# Patient Record
Sex: MALE | URBAN - METROPOLITAN AREA
[De-identification: names, ages, dates, MRNs, and addresses within clinical notes are randomized per-mention and may not be internally consistent; named-entity substitution may affect disease eponyms.]

---
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
## 2021-04-17 ENCOUNTER — HOSPITAL ENCOUNTER (EMERGENCY)
Facility: HOSPITAL | Age: 21
Discharge: HOME | End: 2021-04-18
Attending: STUDENT IN AN ORGANIZED HEALTH CARE EDUCATION/TRAINING PROGRAM
Payer: COMMERCIAL

## 2021-04-17 ENCOUNTER — APPOINTMENT (EMERGENCY)
Dept: RADIOLOGY | Facility: HOSPITAL | Age: 21
End: 2021-04-17
Attending: STUDENT IN AN ORGANIZED HEALTH CARE EDUCATION/TRAINING PROGRAM
Payer: COMMERCIAL

## 2021-04-17 DIAGNOSIS — J36 PERITONSILLAR ABSCESS: Primary | ICD-10-CM

## 2021-04-17 LAB
ANION GAP SERPL CALC-SCNC: 11 MEQ/L (ref 3–15)
BASOPHILS # BLD: 0.05 K/UL (ref 0.01–0.1)
BASOPHILS NFR BLD: 0.2 %
BUN SERPL-MCNC: 8 MG/DL (ref 8–20)
CALCIUM SERPL-MCNC: 9.8 MG/DL (ref 8.9–10.3)
CHLORIDE SERPL-SCNC: 101 MEQ/L (ref 98–109)
CO2 SERPL-SCNC: 24 MEQ/L (ref 22–32)
CREAT SERPL-MCNC: 1 MG/DL (ref 0.8–1.3)
DIFFERENTIAL METHOD BLD: ABNORMAL
EOSINOPHIL # BLD: 0.01 K/UL (ref 0.04–0.54)
EOSINOPHIL NFR BLD: 0 %
ERYTHROCYTE [DISTWIDTH] IN BLOOD BY AUTOMATED COUNT: 12.2 % (ref 11.6–14.4)
GFR SERPL CREATININE-BSD FRML MDRD: >60 ML/MIN/1.73M*2
GLUCOSE SERPL-MCNC: 94 MG/DL (ref 70–99)
HCT VFR BLDCO AUTO: 48.2 % (ref 40.1–51)
HGB BLD-MCNC: 15.9 G/DL (ref 13.7–17.5)
IMM GRANULOCYTES # BLD AUTO: 0.1 K/UL (ref 0–0.08)
IMM GRANULOCYTES NFR BLD AUTO: 0.5 %
LYMPHOCYTES # BLD: 2.24 K/UL (ref 1.2–3.5)
LYMPHOCYTES NFR BLD: 10.6 %
MCH RBC QN AUTO: 29.2 PG (ref 28–33.2)
MCHC RBC AUTO-ENTMCNC: 33 G/DL (ref 32.2–36.5)
MCV RBC AUTO: 88.6 FL (ref 83–98)
MONOCYTES # BLD: 1.61 K/UL (ref 0.3–1)
MONOCYTES NFR BLD: 7.6 %
NEUTROPHILS # BLD: 17.11 K/UL (ref 1.7–7)
NEUTS SEG NFR BLD: 81.1 %
NRBC BLD-RTO: 0 %
PDW BLD AUTO: 10.1 FL (ref 9.4–12.4)
PLATELET # BLD AUTO: 242 K/UL (ref 150–350)
POTASSIUM SERPL-SCNC: 3.9 MEQ/L (ref 3.6–5.1)
RBC # BLD AUTO: 5.44 M/UL (ref 4.5–5.8)
S PYO DNA THROAT QL NAA+PROBE: NOT DETECTED
SODIUM SERPL-SCNC: 136 MEQ/L (ref 136–144)
WBC # BLD AUTO: 21.12 K/UL (ref 3.8–10.5)

## 2021-04-17 PROCEDURE — G1004 CDSM NDSC: HCPCS

## 2021-04-17 PROCEDURE — 36415 COLL VENOUS BLD VENIPUNCTURE: CPT | Performed by: STUDENT IN AN ORGANIZED HEALTH CARE EDUCATION/TRAINING PROGRAM

## 2021-04-17 PROCEDURE — 63600000 HC DRUGS/DETAIL CODE: Mod: JW | Performed by: STUDENT IN AN ORGANIZED HEALTH CARE EDUCATION/TRAINING PROGRAM

## 2021-04-17 PROCEDURE — 86308 HETEROPHILE ANTIBODY SCREEN: CPT | Performed by: STUDENT IN AN ORGANIZED HEALTH CARE EDUCATION/TRAINING PROGRAM

## 2021-04-17 PROCEDURE — 25800000 HC PHARMACY IV SOLUTIONS: Performed by: STUDENT IN AN ORGANIZED HEALTH CARE EDUCATION/TRAINING PROGRAM

## 2021-04-17 PROCEDURE — 3E0333Z INTRODUCTION OF ANTI-INFLAMMATORY INTO PERIPHERAL VEIN, PERCUTANEOUS APPROACH: ICD-10-PCS | Performed by: STUDENT IN AN ORGANIZED HEALTH CARE EDUCATION/TRAINING PROGRAM

## 2021-04-17 PROCEDURE — 63600105 HC IODINE BASED CONTRAST: Performed by: STUDENT IN AN ORGANIZED HEALTH CARE EDUCATION/TRAINING PROGRAM

## 2021-04-17 PROCEDURE — 86665 EPSTEIN-BARR CAPSID VCA: CPT | Performed by: STUDENT IN AN ORGANIZED HEALTH CARE EDUCATION/TRAINING PROGRAM

## 2021-04-17 PROCEDURE — 87651 STREP A DNA AMP PROBE: CPT | Performed by: STUDENT IN AN ORGANIZED HEALTH CARE EDUCATION/TRAINING PROGRAM

## 2021-04-17 PROCEDURE — 96365 THER/PROPH/DIAG IV INF INIT: CPT | Mod: 59

## 2021-04-17 PROCEDURE — 96375 TX/PRO/DX INJ NEW DRUG ADDON: CPT | Mod: 59

## 2021-04-17 PROCEDURE — 85025 COMPLETE CBC W/AUTO DIFF WBC: CPT | Performed by: STUDENT IN AN ORGANIZED HEALTH CARE EDUCATION/TRAINING PROGRAM

## 2021-04-17 PROCEDURE — 25000000 HC PHARMACY GENERAL: Performed by: PHYSICIAN ASSISTANT

## 2021-04-17 PROCEDURE — 80048 BASIC METABOLIC PNL TOTAL CA: CPT | Performed by: STUDENT IN AN ORGANIZED HEALTH CARE EDUCATION/TRAINING PROGRAM

## 2021-04-17 PROCEDURE — 3E03329 INTRODUCTION OF OTHER ANTI-INFECTIVE INTO PERIPHERAL VEIN, PERCUTANEOUS APPROACH: ICD-10-PCS | Performed by: STUDENT IN AN ORGANIZED HEALTH CARE EDUCATION/TRAINING PROGRAM

## 2021-04-17 PROCEDURE — 96361 HYDRATE IV INFUSION ADD-ON: CPT

## 2021-04-17 PROCEDURE — 3E0337Z INTRODUCTION OF ELECTROLYTIC AND WATER BALANCE SUBSTANCE INTO PERIPHERAL VEIN, PERCUTANEOUS APPROACH: ICD-10-PCS | Performed by: STUDENT IN AN ORGANIZED HEALTH CARE EDUCATION/TRAINING PROGRAM

## 2021-04-17 PROCEDURE — 99284 EMERGENCY DEPT VISIT MOD MDM: CPT | Mod: 25

## 2021-04-17 RX ORDER — CLINDAMYCIN PHOSPHATE 600 MG/50ML
600 INJECTION, SOLUTION INTRAVENOUS ONCE
Status: COMPLETED | OUTPATIENT
Start: 2021-04-17 | End: 2021-04-17

## 2021-04-17 RX ORDER — PREDNISONE 5 MG/1
TABLET ORAL
Qty: 55 TABLET | Refills: 0 | Status: SHIPPED | OUTPATIENT
Start: 2021-04-18 | End: 2024-01-27

## 2021-04-17 RX ORDER — IBUPROFEN 400 MG/1
400 TABLET ORAL EVERY 6 HOURS PRN
COMMUNITY

## 2021-04-17 RX ORDER — DEXAMETHASONE SODIUM PHOSPHATE 4 MG/ML
10 INJECTION, SOLUTION INTRA-ARTICULAR; INTRALESIONAL; INTRAMUSCULAR; INTRAVENOUS; SOFT TISSUE ONCE
Status: COMPLETED | OUTPATIENT
Start: 2021-04-17 | End: 2021-04-17

## 2021-04-17 RX ORDER — CLINDAMYCIN HYDROCHLORIDE 150 MG/1
150 CAPSULE ORAL 3 TIMES DAILY
Qty: 30 CAPSULE | Refills: 0 | Status: SHIPPED | OUTPATIENT
Start: 2021-04-18 | End: 2021-04-28

## 2021-04-17 RX ADMIN — DEXAMETHASONE SODIUM PHOSPHATE 10 MG: 4 INJECTION, SOLUTION INTRA-ARTICULAR; INTRALESIONAL; INTRAMUSCULAR; INTRAVENOUS; SOFT TISSUE at 20:09

## 2021-04-17 RX ADMIN — IOHEXOL 75 ML: 300 INJECTION, SOLUTION INTRAVENOUS at 21:40

## 2021-04-17 RX ADMIN — SODIUM CHLORIDE 1000 ML: 9 INJECTION, SOLUTION INTRAVENOUS at 20:03

## 2021-04-17 RX ADMIN — CLINDAMYCIN IN 5 PERCENT DEXTROSE 600 MG: 12 INJECTION, SOLUTION INTRAVENOUS at 23:19

## 2021-04-17 ASSESSMENT — ENCOUNTER SYMPTOMS
FEVER: 0
NAUSEA: 0
TROUBLE SWALLOWING: 0
COUGH: 0
DIARRHEA: 0
ABDOMINAL PAIN: 0
CHILLS: 0
VOMITING: 0
SORE THROAT: 1
SHORTNESS OF BREATH: 0
CHEST TIGHTNESS: 0

## 2021-04-17 NOTE — ED ATTESTATION NOTE
"I saw and evaluated the patient, participated in the management, and agree with the findings in the above note. We discussed the case and the treatment plan.  Exam:  Visit Vitals  BP (!) 145/94   Pulse 72   Temp 37.2 °C (99 °F) (Tympanic)   Resp 18   Ht 1.727 m (5' 8\")   Wt 63.5 kg (140 lb)   SpO2 97%   BMI 21.29 kg/m²   vss, nad, nontoxic, head at/nc, normal speech, no resp distress, symmetric facial features, no trismus or drooling, pharynx with generalized erythema without exudate, pharynx appears symmetric; no obvious PTA. Mild-moderate dry mucous membranes. Neck supple with lymphadenopathy and tenderness R>L anterior-lateral neck. Coordinated movement. Stable gait.      Humberto Solorzano DO  04/17/21 2004    "

## 2021-04-18 VITALS
SYSTOLIC BLOOD PRESSURE: 131 MMHG | OXYGEN SATURATION: 99 % | WEIGHT: 140 LBS | HEIGHT: 68 IN | RESPIRATION RATE: 18 BRPM | TEMPERATURE: 99 F | HEART RATE: 64 BPM | BODY MASS INDEX: 21.22 KG/M2 | DIASTOLIC BLOOD PRESSURE: 78 MMHG

## 2021-04-18 NOTE — DISCHARGE INSTRUCTIONS
Take Clindamycin as instructed, your first dose was given in the ER. Take prednisone as directed. Follow up with ENT on Monday.    Return to the ED for worsening of symptoms or any problems or concerns.  It is very important to follow up with your healthcare provider for re-evaluation.

## 2021-04-19 LAB — HETEROPH AB SER QL LA: NEGATIVE

## 2021-04-20 LAB
EBV NA 1 IGG SER-ACNC: 5.94
EBV VCA IGG SER IA-ACNC: 4.97
EBV VCA IGM SER IA-ACNC: 0.85
INTERPRETATION: ABNORMAL

## 2024-01-27 ENCOUNTER — APPOINTMENT (EMERGENCY)
Dept: RADIOLOGY | Facility: HOSPITAL | Age: 24
End: 2024-01-27
Attending: EMERGENCY MEDICINE
Payer: COMMERCIAL

## 2024-01-27 ENCOUNTER — HOSPITAL ENCOUNTER (EMERGENCY)
Facility: HOSPITAL | Age: 24
Discharge: HOME | End: 2024-01-27
Attending: EMERGENCY MEDICINE
Payer: COMMERCIAL

## 2024-01-27 VITALS
HEART RATE: 67 BPM | SYSTOLIC BLOOD PRESSURE: 133 MMHG | OXYGEN SATURATION: 98 % | TEMPERATURE: 98.8 F | BODY MASS INDEX: 22.73 KG/M2 | RESPIRATION RATE: 18 BRPM | HEIGHT: 68 IN | DIASTOLIC BLOOD PRESSURE: 77 MMHG | WEIGHT: 150 LBS

## 2024-01-27 DIAGNOSIS — M25.312 INSTABILITY OF LEFT SHOULDER JOINT: Primary | ICD-10-CM

## 2024-01-27 PROCEDURE — 73030 X-RAY EXAM OF SHOULDER: CPT | Mod: LT

## 2024-01-27 PROCEDURE — 73000 X-RAY EXAM OF COLLAR BONE: CPT | Mod: LT

## 2024-01-27 PROCEDURE — 63700000 HC SELF-ADMINISTRABLE DRUG

## 2024-01-27 PROCEDURE — 99283 EMERGENCY DEPT VISIT LOW MDM: CPT | Mod: 25

## 2024-01-27 RX ORDER — ACETAMINOPHEN 325 MG/1
975 TABLET ORAL ONCE
Status: COMPLETED | OUTPATIENT
Start: 2024-01-27 | End: 2024-01-27

## 2024-01-27 RX ADMIN — ACETAMINOPHEN 975 MG: 325 TABLET ORAL at 19:48

## 2024-01-27 ASSESSMENT — ENCOUNTER SYMPTOMS
NAUSEA: 1
WEAKNESS: 1
FEVER: 0
COUGH: 0
CHILLS: 0

## 2024-01-27 NOTE — ED ATTESTATION NOTE
I have personally seen and examined Jaison Russell. I personally performed the key components of the encounter and provided a substantive portion of the care and medical decision making for this patient.    I reviewed and agree with physician assistant’s assessment and plan of care, with any exceptions as documented below.     My focused history, examination, assessment, and plan of care of Jaison Russell is as follows:    Brief History:   23-year-old right-handed male presented with intermittent left shoulder dislocations for the past several months.  Patient stated that he his frequency of dislocation reduction were approximately once per month now its almost every other day.  Last seen Tamara 2-1/2 months ago and has not schedule the outpatient MRI of his shoulder with order given by [facility] orthopedics.  Patient is here today because he felt tingling since left hand following the latest dislocation reduction episode  Focused Physical Exam:   Vitals:    01/27/24 1821   BP: (!) 165/104   Pulse: (!) 106   Resp: 18   Temp: 37.1 °C (98.8 °F)   SpO2: 97%   Awake and alert good eye contact pleasant cooperative.  Normal left clavicle without any AC tenting.  No obvious deformity of his left shoulder with intact full sensation to light touch without any numbness to the deltoid dermatome.  Full radial ulnar and median distribution sensation to light touch and movement of the left hand along with full radial ulnar pulses and good cap refills.  Nontender left hand left wrist and also left elbow.  Full strength in left elbow flexion against active resistance.  Fully able to take off his T-shirt and put on his gown without any limitations      Assessment / Plan / MDM:  Clinical history and exam concerning for but not limited to:  X-rays  Shoulder immobilizer       Baldev Blackwood MD  01/27/24 [id number]

## 2024-01-28 NOTE — ED PROVIDER NOTES
Emergency Medicine Note  HPI   HISTORY OF PRESENT ILLNESS     HPI   This is a 21-year-old male patient, past medical history of recurrent posterior shoulder dislocation followed by [facility] orthopedics, presenting status post left-sided posterior shoulder dislocation today.  Patient relates he was reaching backwards and felt his shoulder dislocate this morning, patient relates he is able to self reduce popped it back into place.  He relates some left shoulder pain, left clavicle pain, intermittent tingling, and decreased strength to the right upper extremity.  Patient is right-handed.  Denies any fever, cough, chills.  Patient has not taken anything for pain.  Pain is 10 out of 10 in severity.  Patient relates some nausea secondary to pain.      Patient History   PAST HISTORY     Reviewed from Nursing Triage:       History reviewed. No pertinent past medical history.    Past Surgical History:   Procedure Laterality Date   • CLEFT PALATE REPAIR         History reviewed. No pertinent family history.    Social History     Tobacco Use   • Smoking status: Some Days     Types: Cigarettes   • Smokeless tobacco: Never   Substance Use Topics   • Alcohol use: Not Currently   • Drug use: Yes     Types: Marijuana     Comment: pt states has medical card         Review of Systems   REVIEW OF SYSTEMS     Review of Systems   Constitutional: Negative for chills and fever.   Respiratory: Negative for cough.    Gastrointestinal: Positive for nausea.   Musculoskeletal:        Positive for left shoulder pain, left clavicle pain.   Neurological: Positive for weakness (Left upper extremity.).        Positive for tingling to the upper extremity.         VITALS     ED Vitals    Date/Time Temp Pulse Resp BP SpO2 [facility]   01/27/24 1821 37.1 °C (98.8 °F) 106 18 165/104 97 % KAK        Pulse Ox %: 97 % (01/27/24 1851)  Pulse Ox Interpretation: Normal (01/27/24 1851)           Physical Exam   PHYSICAL EXAM     Physical Exam  Constitutional:        General: He is not in acute distress.     Comments: Awake, alert, answers questions appropriately.   Cardiovascular:      Comments: 2+ radial pulse to the right upper extremity.  Musculoskeletal:      Comments: Full range of motion to the left elbow and wrist.  Patient is able to range the shoulder joint.  Tenderness to palpation of the left shoulder and clavicle without deformity, step-offs, crepitus, skin tenting.    Full range of motion to the hand in the radial, medial and ulnar distributions.   Skin:     Comments: No ecchymosis, abrasions overlying the left-sided shoulder.   Neurological:      Comments: Sensation intact overlying the left deltoid.  Sensation is intact to nerves in the radial, medial and ulnar distributions.           PROCEDURES     Procedures     DATA     Results     None          Imaging Results    None         No orders to display       Scoring tools                                  ED Course & MDM   MDM / ED COURSE / CLINICAL IMPRESSION / DISPO     MDM    ED Course as of 01/27/24 2234   Sat Jan 27, 2024 2045 X-rays are unremarkable  Patient can be safely discharged home with shoulder immobilizer and Rayne follow-up [HL]   2233 Supportive care, follow-up instructions and strict return precautions discussed.  All questions answered.  Patient understands and agrees with the plan. [KT]      ED Course User Index  [HL] Baldev Blackwood MD  [KT] Erika Guerra PA C     Clinical Impression      None               rEika Guerra PA C  01/27/24 [id number]       Erika Guerra PA C  01/27/24 [id number]

## 2024-01-28 NOTE — DISCHARGE INSTRUCTIONS
Ice, heat ever feels best for pain.  Tylenol or ibuprofen as needed, you can alternate no more than 3 g/day.  Wear shoulder immobilizer as needed.    Follow-up with [facility] orthopedic in the next 2 to 3 days for reevaluation and follow-up imaging.  You should also follow-up with your primary care provider in the next 2 to 3 days.    Return with any new or worsening pain, dislocation, weakness, numbness or any other new or worsening symptoms.

## 2024-02-05 ENCOUNTER — TELEPHONE (OUTPATIENT)
Dept: RADIOLOGY | Facility: HOSPITAL | Age: 24
End: 2024-02-05
Payer: COMMERCIAL

## 2024-02-06 ENCOUNTER — HOSPITAL ENCOUNTER (OUTPATIENT)
Dept: RADIOLOGY | Facility: HOSPITAL | Age: 24
Discharge: HOME | End: 2024-02-06
Attending: FAMILY MEDICINE | Admitting: RADIOLOGY
Payer: COMMERCIAL

## 2024-02-06 ENCOUNTER — HOSPITAL ENCOUNTER (OUTPATIENT)
Dept: RADIOLOGY | Age: 24
Discharge: HOME | End: 2024-02-06
Attending: FAMILY MEDICINE
Payer: COMMERCIAL

## 2024-02-06 VITALS
HEIGHT: 69 IN | RESPIRATION RATE: 15 BRPM | OXYGEN SATURATION: 98 % | BODY MASS INDEX: 21.62 KG/M2 | HEART RATE: 67 BPM | SYSTOLIC BLOOD PRESSURE: 147 MMHG | DIASTOLIC BLOOD PRESSURE: 83 MMHG | WEIGHT: 146 LBS

## 2024-02-06 DIAGNOSIS — M25.512 LEFT SHOULDER PAIN: ICD-10-CM

## 2024-02-06 PROCEDURE — 25000000 HC PHARMACY GENERAL: Performed by: PHYSICIAN ASSISTANT

## 2024-02-06 PROCEDURE — A9579 GAD-BASE MR CONTRAST NOS,1ML: HCPCS

## 2024-02-06 PROCEDURE — 77002 NEEDLE LOCALIZATION BY XRAY: CPT

## 2024-02-06 PROCEDURE — BP191ZZ FLUOROSCOPY OF LEFT SHOULDER USING LOW OSMOLAR CONTRAST: ICD-10-PCS | Performed by: RADIOLOGY

## 2024-02-06 PROCEDURE — A9573: HCPCS

## 2024-02-06 PROCEDURE — 63600105 HC IODINE BASED CONTRAST

## 2024-02-06 RX ORDER — LIDOCAINE HYDROCHLORIDE 10 MG/ML
INJECTION, SOLUTION INFILTRATION; PERINEURAL
Status: COMPLETED | OUTPATIENT
Start: 2024-02-06 | End: 2024-02-06

## 2024-02-06 RX ORDER — IOPAMIDOL 612 MG/ML
5 INJECTION, SOLUTION INTRAVASCULAR
Status: COMPLETED | OUTPATIENT
Start: 2024-02-06 | End: 2024-02-06

## 2024-02-06 RX ADMIN — GADOPICLENOL 1 ML: 485.1 INJECTION INTRAVENOUS at 08:25

## 2024-02-06 RX ADMIN — LIDOCAINE HYDROCHLORIDE 5 ML: 10 INJECTION, SOLUTION INFILTRATION; PERINEURAL at 08:20

## 2024-02-06 RX ADMIN — IOPAMIDOL 1 ML: 612 INJECTION, SOLUTION INTRAVENOUS at 08:24

## 2024-02-06 NOTE — POST-PROCEDURE NOTE
Interventional Radiology Brief Postprocedure Note    Jaison Russell     Attending: AUGUSTA Novoa MD    Assistant: none    Diagnosis: left shoulder pain    Description of procedure: left shoulder injection, pre mR    Contrast: 1 mL iso  18 mL dil charmaine     Anesthesia:  Local (Lidocaine)    Volume of Lidocaine Utilized (ml): 5     Medications: none     Complications: None      Estimated Blood Loss: Estimated Blood Loss: 0-10 ml    Anticoagulation: none    Specimens: none    Findings: successful left shoulder injection, pre mR  Pt tolerated procedure well  Vs stable    To MR    2/6/2024 8:24 AM

## 2024-02-06 NOTE — DISCHARGE INSTR - OTHER ORDERS
DISCHARGE INSTRUCTIONS JOINT INJECTION/ASPIRATION    You have had an injection of your left shoulder for MR arthrogram.  You may resume your normal diet.  You may resume your normal medications. If you are taking a blood thinner, resume after 24 hours.  Do not drink any alcoholic beverages for the next 24 hours.  You may resume normal activity today as tolerated. You may want to avoid strenuous activity of the affected joint today.  The joint may be sore for the next 24 hours.     Notify your primary physician and/or Interventional Radiologist IMMEDIATELY if any of the following occur:  · Fever of 101° F (38 ° C) or chills  · Swelling and or redness in the area of the puncture site  · Worsening pain  Physician Contact Information  If you have a problem that you believe requires immediate attention, you should go to the Emergency Room, either at [facility] or the closest hospital. If you believe that your problem can safely wait until you speak to a physician, you should contact your doctor or Interventional Radiologist.   It is usually easier to contact your own physician by calling the office, or after hours through the answering service. If you do not know the number, the hospital  can connect you by calling [phone].   If you have concerns that need to be answered by the Interventional Radiologist, you can reach him/ her as follows:   During regular weekday hours (8AM to 5PM), call [phone] and ask the technologist to connect you with the Interventional Radiologist.   During off hours for emergencies call [phone] and ask the hospital  to contact the Interventional Radiologist on-call.    [facility] strongly recommends that you visit a Primary Care Provider (PCP) regularly. Your PCP can help you implement the recommendations we gave you today, coordinate care among your specialists, as well as make sure you are up to date with wellness exams,immunizations and preventive  screenings. Your PCP can also help when you are feeling sick, potentially avoiding the need for urgent care or emergency department visits. For these reasons, it is important that you follow up with your PCP at least annually or more often based upon your medical conditions. If you do not have a PCP, please [phone] ([phone]) or go to [email or web address] help with finding one.

## 2024-02-06 NOTE — OR SURGEON
Pre-Procedure patient identification:  I am the primary operating surgeon/proceduralist and I have reviewed the applicable pathology reports and radiology studies for this procedure. I have identified the patient and confirmed laterality is left on 02/06/24 at 8:04 AM AUGUSTA Novoa C

## 2024-02-06 NOTE — H&P
Inpatient History & Physical     Admitting Diagnosis: Left shoulder pain [M25.512]    HPI  Patient is a 23 y.o. male with left shoulder pain presenting to IR for injection pre-MR arthrogram.  Patient has had chronic left shoulder pain as well as an injury with snowboarding.  No recent fevers.  He has had prior contrast without reaction.    Medical History: History reviewed. No pertinent past medical history.    Surgical History:   Past Surgical History:   Procedure Laterality Date   • CLEFT PALATE REPAIR         Allergies: Patient has no known allergies.    No current facility-administered medications for this encounter.       Social History:   Social History     Socioeconomic History   • Marital status: Single     Spouse name: None   • Number of children: None   • Years of education: None   • Highest education level: None   Tobacco Use   • Smoking status: Some Days     Types: Cigarettes   • Smokeless tobacco: Never   Substance and Sexual Activity   • Alcohol use: Not Currently   • Drug use: Yes     Types: Marijuana     Comment: pt states has medical card       Review of Systems  Constitutional: negative for fevers    Objective     Vital Signs for the last 24 hours:  Heart Rate:  [76] 76  Resp:  [16] 16  BP: (136)/(87) 136/87      General appearance: alert, appears stated age, no distress and cooperative  left shoulder without erythema        Assessment/Plan : 23-year-old male with left shoulder pain presenting to IR for injection pre-MR arthrogram.  The procedure was reviewed and consent obtained.      Code Status: No Order  Estimated discharge date: 2/6/24

## 2024-11-14 ENCOUNTER — TRANSCRIBE ORDERS (OUTPATIENT)
Dept: SCHEDULING | Age: 24
End: 2024-11-14

## 2024-11-14 DIAGNOSIS — M25.312 OTHER INSTABILITY, LEFT SHOULDER: Primary | ICD-10-CM

## 2024-12-04 ENCOUNTER — HOSPITAL ENCOUNTER (OUTPATIENT)
Dept: RADIOLOGY | Age: 24
Discharge: HOME | End: 2024-12-04
Attending: ORTHOPAEDIC SURGERY
Payer: COMMERCIAL

## 2024-12-04 DIAGNOSIS — M25.312 OTHER INSTABILITY, LEFT SHOULDER: ICD-10-CM

## 2024-12-04 PROCEDURE — 73200 CT UPPER EXTREMITY W/O DYE: CPT | Mod: LT

## 2025-02-03 ENCOUNTER — TRANSCRIBE ORDERS (OUTPATIENT)
Dept: SCHEDULING | Age: 25
End: 2025-02-03

## 2025-02-03 DIAGNOSIS — I73.9 PERIPHERAL VASCULAR DISEASE, UNSPECIFIED (CMS/HCC): Primary | ICD-10-CM

## 2025-02-17 ENCOUNTER — TRANSCRIBE ORDERS (OUTPATIENT)
Dept: SCHEDULING | Age: 25
End: 2025-02-17

## 2025-02-17 DIAGNOSIS — M25.312 OTHER INSTABILITY, LEFT SHOULDER: Primary | ICD-10-CM

## 2025-02-18 ENCOUNTER — HOSPITAL ENCOUNTER (OUTPATIENT)
Dept: RADIOLOGY | Facility: HOSPITAL | Age: 25
Discharge: HOME | End: 2025-02-18
Attending: ORTHOPAEDIC SURGERY
Payer: COMMERCIAL

## 2025-02-18 DIAGNOSIS — M25.312 OTHER INSTABILITY, LEFT SHOULDER: ICD-10-CM

## 2025-02-18 PROCEDURE — 71250 CT THORAX DX C-: CPT
